# Patient Record
Sex: MALE | Race: WHITE | Employment: FULL TIME | URBAN - METROPOLITAN AREA
[De-identification: names, ages, dates, MRNs, and addresses within clinical notes are randomized per-mention and may not be internally consistent; named-entity substitution may affect disease eponyms.]

---

## 2022-10-07 ENCOUNTER — HOSPITAL ENCOUNTER (EMERGENCY)
Age: 37
Discharge: HOME | End: 2022-10-07
Payer: MEDICAID

## 2022-10-07 VITALS
BODY MASS INDEX: 19.73 KG/M2 | SYSTOLIC BLOOD PRESSURE: 121 MMHG | HEART RATE: 73 BPM | DIASTOLIC BLOOD PRESSURE: 84 MMHG | OXYGEN SATURATION: 97 % | HEIGHT: 76 IN | TEMPERATURE: 98.3 F | WEIGHT: 162 LBS | RESPIRATION RATE: 20 BRPM

## 2022-10-07 DIAGNOSIS — L03.115 CELLULITIS OF RIGHT ANKLE: Primary | ICD-10-CM

## 2022-10-07 PROCEDURE — 87077 CULTURE AEROBIC IDENTIFY: CPT

## 2022-10-07 PROCEDURE — 87205 SMEAR GRAM STAIN: CPT

## 2022-10-07 PROCEDURE — 87070 CULTURE OTHR SPECIMN AEROBIC: CPT

## 2022-10-07 PROCEDURE — 99283 EMERGENCY DEPT VISIT LOW MDM: CPT

## 2022-10-07 RX ORDER — SULFAMETHOXAZOLE AND TRIMETHOPRIM 800; 160 MG/1; MG/1
1 TABLET ORAL 2 TIMES DAILY
Qty: 20 TABLET | Refills: 0 | Status: SHIPPED | OUTPATIENT
Start: 2022-10-07 | End: 2022-10-17

## 2022-10-07 RX ORDER — CEPHALEXIN 500 MG/1
500 CAPSULE ORAL 4 TIMES DAILY
Qty: 40 CAPSULE | Refills: 0 | Status: SHIPPED | OUTPATIENT
Start: 2022-10-07 | End: 2022-10-17

## 2022-10-07 ASSESSMENT — ENCOUNTER SYMPTOMS
RESPIRATORY NEGATIVE: 1
ABDOMINAL PAIN: 0
COUGH: 0
VOMITING: 0
NAUSEA: 0
SHORTNESS OF BREATH: 0
COLOR CHANGE: 1
BACK PAIN: 0

## 2022-10-07 ASSESSMENT — PAIN SCALES - GENERAL: PAINLEVEL_OUTOF10: 10

## 2022-10-07 ASSESSMENT — PAIN - FUNCTIONAL ASSESSMENT: PAIN_FUNCTIONAL_ASSESSMENT: 0-10

## 2022-10-07 NOTE — ED PROVIDER NOTES
[facility]        Pt Name: Randolph Quinteros  MRN: [id number]  [place] 1985  Date of evaluation: 10/7/2022  Provider: VIVI Javier  PCP: No primary care provider on file. Note Started: 10:50 AM EDT       GINA. I have evaluated this patient. My supervising physician was available for consultation. CHIEF COMPLAINT       Chief Complaint   Patient presents with    Wound Infection     Wound to right ankle for approx 1 week \"started out as mosquito bite\". Area red and swollen       HISTORY OF PRESENT ILLNESS   (Location, Timing/Onset, Context/Setting, Quality, Duration, Modifying Factors, Severity, Associated Signs and Symptoms)  Note limiting factors. Chief Complaint: Wound Infection     Randolph Quinteros is a 40 y.o. male with no significant past medical history who presents to the ED with complaint of a wound infection. Patient dates about a week ago he was playing Frisbee golf and states he thinks he may have been bit by mosquito. Patient states he was itching the area while playing corn hole earlier this week and he states ever since then he states it has become red, inflamed, edematous, warm. He denies any fever or chills. Denies no history of cellulitis in the past.  He denies any immunocompromise status including diabetes, HIV, AIDS, chemotherapy, cancer. Became concerned and came to the ED for further evaluation and treatment. He denies decreased range of motion or strength. States aching pain rated 10/10 worse with palpation. He denies any circumferential swelling or redness. He denies any streaking up the leg. Denies numbness or tingling. Denies any other rashes or lesions. He denies any specific injury or trauma. Nursing Notes were all reviewed and agreed with or any disagreements were addressed in the HPI.     REVIEW OF SYSTEMS    (2-9 systems for level 4, 10 or more for level 5)     Review of Systems   Constitutional:  Negative for activity change, appetite change, chills and fever. Respiratory: Negative. Negative for cough and shortness of breath. Cardiovascular: Negative. Negative for chest pain. Gastrointestinal:  Negative for abdominal pain, nausea and vomiting. Genitourinary:  Negative for difficulty urinating and dysuria. Musculoskeletal:  Positive for joint swelling and myalgias. Negative for arthralgias, back pain, gait problem, neck pain and neck stiffness. Skin:  Positive for color change and wound. Negative for pallor and rash. Neurological:  Negative for dizziness, weakness, light-headedness and numbness. Positives and Pertinent negatives as per HPI. Except as noted above in the ROS, all other systems were reviewed and negative. PAST MEDICAL HISTORY     Past Medical History:   Diagnosis Date    Bradycardia          SURGICAL HISTORY     Past Surgical History:   Procedure Laterality Date    EYE SURGERY      left eye    HERNIA REPAIR           CURRENTMEDICATIONS       Discharge Medication List as of 10/7/2022 10:43 AM            ALLERGIES     Patient has no known allergies. FAMILYHISTORY     History reviewed. No pertinent family history.        SOCIAL HISTORY       Social History     Tobacco Use    Smoking status: Every Day     Packs/day: 0.50     Types: Cigarettes    Smokeless tobacco: Never   Substance Use Topics    Alcohol use: Yes     Comment: socially    Drug use: Yes     Types: Marijuana (Weed)       SCREENINGS    Tang Coma Scale  Eye Opening: Spontaneous  Best Verbal Response: Oriented  Best Motor Response: Obeys commands  Ponce Coma Scale Score: 15        PHYSICAL EXAM    (up to 7 for level 4, 8 or more for level 5)     ED Triage Vitals [10/07/22 1022]   BP Temp Temp Source Heart Rate Resp SpO2 Height Weight   121/84 98.3 °F (36.8 °C) Oral 73 20 97 % 6' 4\" (1.93 m) 162 lb (73.5 kg)       Physical Exam  Constitutional:       General: He is not in acute distress. Appearance: Normal appearance. He is well-developed. He is not ill-appearing, toxic-appearing or diaphoretic. HENT:      Head: Normocephalic and atraumatic. Right Ear: External ear normal.      Left Ear: External ear normal.   Eyes:      General:         Right eye: No discharge. Left eye: No discharge. Pulmonary:      Effort: Pulmonary effort is normal. No respiratory distress. Breath sounds: No stridor. Musculoskeletal:         General: Normal range of motion. Cervical back: Normal range of motion and neck supple. Skin:     General: Skin is warm and dry. Coloration: Skin is not pale. Findings: Erythema present. No rash. Comments: To the right lateral ankle there appears to be small area of erythema, warmth with some skin breakdown. No spontaneous bleeding or drainage at this time. There is no crepitus. No circumferential erythema or edema. There is full range of motion and strength of the knee, ankle and foot. Dorsalis pedis pulse and capillary refill brisk. Sensation intact to the radial ulnar aspects of distal fingertips. Compartments are soft. Achilles tendon intact. There is no lymphangitic streaking. No crepitus. Neurological:      Mental Status: He is alert and oriented to person, place, and time. Psychiatric:         Behavior: Behavior normal.       DIAGNOSTIC RESULTS   LABS:    Labs Reviewed   CULTURE, WOUND       When ordered only abnormal lab results are displayed. All other labs were within normal range or not returned as of this dictation. EKG: When ordered, EKG's are interpreted by the Emergency Department Physician in the absence of a cardiologist.  Please see their note for interpretation of EKG.     RADIOLOGY:   Non-plain film images such as CT, Ultrasound and MRI are read by the radiologist. Plain radiographic images are visualized and preliminarily interpreted by the ED Provider with the below findings:        Interpretation per the Radiologist below, if available at the time of this note:    No orders to display     No results found. PROCEDURES   Unless otherwise noted below, none     Procedures    CRITICAL CARE TIME       CONSULTS:  None      EMERGENCY DEPARTMENT COURSE and DIFFERENTIAL DIAGNOSIS/MDM:   Vitals:    Vitals:    10/07/22 1022   BP: 121/84   Pulse: 73   Resp: 20   Temp: 98.3 °F (36.8 °C)   TempSrc: Oral   SpO2: 97%   Weight: 162 lb (73.5 kg)   Height: 6' 4\" (1.93 m)       Patient was given the following medications:  Medications - No data to display      Is this patient to be included in the SEP-1 Core Measure due to severe sepsis or septic shock? No   Exclusion criteria - the patient is NOT to be included for SEP-1 Core Measure due to:  2+ SIRS criteria are not met    Patient is a 63-year-old male who presents to the ED with what appears to be a infection/cellulitis to the right lateral ankle. He states he sustained a mosquito bite while playing Frisbee golf about a week ago. States he is scratch the area and states the skin is now opened up and now is erythematous, edematous and warm. Upon examination he appears to have localized cellulitis noted to the right lateral ankle. Is not circumferential.  Does not streak up the leg. There is full range of motion and strength. Distal neurovascular intact. Not believe further imaging or work-up indicated at this time. He will be instructed on over-the-counter medication will discharge home with antibiotics with Keflex and Bactrim. Wound culture was obtained. Instructed on 48-hour wound check/follow-up. Should symptoms worsen despite antibiotics he needs to return the emergency department immediately for further evaluation treatment.   Low suspicion for necrotizing fasciitis, abscess required incision and drainage, significant cellulitis, deep space involvement, retained foreign body, acute fracture/dislocation, septic arthritis, gout, DVT, arterial occlusion, tendon injury, nerve injury, arterial injury or other emergent etiology at this time. FINAL IMPRESSION      1.  Cellulitis of right ankle          DISPOSITION/PLAN   DISPOSITION Decision To Discharge 10/07/2022 10:41:39 AM      PATIENT REFERRED TO:  [facility] Emergency Department  [address]  [address] [address]  [phone]  Go to   As needed, If symptoms worsen      DISCHARGE MEDICATIONS:  Discharge Medication List as of 10/7/2022 10:43 AM        START taking these medications    Details   cephALEXin (KEFLEX) 500 MG capsule Take 1 capsule by mouth 4 times daily for 10 days, Disp-40 capsule, R-0Print      sulfamethoxazole-trimethoprim (BACTRIM DS) 800-160 MG per tablet Take 1 tablet by mouth 2 times daily for 10 days, Disp-20 tablet, R-0Print             DISCONTINUED MEDICATIONS:  Discharge Medication List as of 10/7/2022 10:43 AM        STOP taking these medications       naproxen (NAPROSYN) 500 MG tablet Comments:   Reason for Stopping:                      (Please note that portions of this note were completed with a voice recognition program.  Efforts were made to edit the dictations but occasionally words are mis-transcribed.)    VIVI Ortiz (electronically signed)          VIVI Monet  10/07/22 0773

## 2022-10-09 LAB
GRAM STAIN RESULT: ABNORMAL
ORGANISM: ABNORMAL
ORGANISM: ABNORMAL
WOUND/ABSCESS: ABNORMAL
WOUND/ABSCESS: ABNORMAL